# Patient Record
Sex: FEMALE | Employment: UNEMPLOYED | URBAN - METROPOLITAN AREA
[De-identification: names, ages, dates, MRNs, and addresses within clinical notes are randomized per-mention and may not be internally consistent; named-entity substitution may affect disease eponyms.]

---

## 2018-10-31 ENCOUNTER — THERAPY VISIT (OUTPATIENT)
Dept: PHYSICAL THERAPY | Facility: CLINIC | Age: 43
End: 2018-10-31
Payer: COMMERCIAL

## 2018-10-31 DIAGNOSIS — G89.29 CHRONIC PAIN OF BOTH KNEES: Primary | ICD-10-CM

## 2018-10-31 DIAGNOSIS — M25.562 CHRONIC PAIN OF BOTH KNEES: Primary | ICD-10-CM

## 2018-10-31 DIAGNOSIS — M25.561 CHRONIC PAIN OF BOTH KNEES: Primary | ICD-10-CM

## 2018-10-31 PROCEDURE — 97110 THERAPEUTIC EXERCISES: CPT | Mod: GP | Performed by: PHYSICAL THERAPIST

## 2018-10-31 PROCEDURE — 97161 PT EVAL LOW COMPLEX 20 MIN: CPT | Mod: GP | Performed by: PHYSICAL THERAPIST

## 2018-10-31 ASSESSMENT — ACTIVITIES OF DAILY LIVING (ADL)
PAIN: THE SYMPTOM AFFECTS MY ACTIVITY MODERATELY
GO DOWN STAIRS: ACTIVITY IS FAIRLY DIFFICULT
KNEE_ACTIVITY_OF_DAILY_LIVING_SCORE: 30
GO UP STAIRS: ACTIVITY IS FAIRLY DIFFICULT
GIVING WAY, BUCKLING OR SHIFTING OF KNEE: THE SYMPTOM AFFECTS MY ACTIVITY SEVERELY
SQUAT: ACTIVITY IS VERY DIFFICULT
RISE FROM A CHAIR: ACTIVITY IS VERY DIFFICULT
STIFFNESS: THE SYMPTOM AFFECTS MY ACTIVITY MODERATELY
WALK: ACTIVITY IS FAIRLY DIFFICULT
AS_A_RESULT_OF_YOUR_KNEE_INJURY,_HOW_WOULD_YOU_RATE_YOUR_CURRENT_LEVEL_OF_DAILY_ACTIVITY?: NEARLY NORMAL
HOW_WOULD_YOU_RATE_THE_CURRENT_FUNCTION_OF_YOUR_KNEE_DURING_YOUR_USUAL_DAILY_ACTIVITIES_ON_A_SCALE_FROM_0_TO_100_WITH_100_BEING_YOUR_LEVEL_OF_KNEE_FUNCTION_PRIOR_TO_YOUR_INJURY_AND_0_BEING_THE_INABILITY_TO_PERFORM_ANY_OF_YOUR_USUAL_DAILY_ACTIVITIES?: 40
HOW_WOULD_YOU_RATE_THE_OVERALL_FUNCTION_OF_YOUR_KNEE_DURING_YOUR_USUAL_DAILY_ACTIVITIES?: NEARLY NORMAL
WEAKNESS: THE SYMPTOM AFFECTS MY ACTIVITY MODERATELY
KNEE_ACTIVITY_OF_DAILY_LIVING_SUM: 21
RAW_SCORE: 21
KNEEL ON THE FRONT OF YOUR KNEE: I AM UNABLE TO DO THE ACTIVITY
SIT WITH YOUR KNEE BENT: ACTIVITY IS VERY DIFFICULT
STAND: ACTIVITY IS FAIRLY DIFFICULT
SWELLING: THE SYMPTOM AFFECTS MY ACTIVITY SEVERELY
LIMPING: THE SYMPTOM AFFECTS MY ACTIVITY MODERATELY

## 2018-10-31 NOTE — LETTER
[facility]  [address] [address]  [phone]    2018    Re: Marixa Moeller   :   1975  MRN:  [id number]   REFERRING PHYSICIAN:   Pinky Spencer PA-C    [facility]    Date of Initial Evaluation:  10/31/18  Visits:  Rxs Used: 1  Reason for Referral:  Chronic pain of both knees    EVALUATION SUMMARY    [facility] Initial Evaluation    Subjective:  Patient is a 43 year old female presenting with rehab right knee hpi. The history is provided by the patient. No  was used.   Marixa Moeller is a 43 year old female with a bilateral knees (R>L) condition.  Condition occurred with:  Insidious onset.  Condition occurred: for unknown reasons.  This is a new condition  Aug. 2018. Patient started having B knee pain about 2 months ago. Her R knee pain worsened about 3 weeks ago. Her R knee buckled and gave out on her causing her to fall 2 weeks ago. She went to the ER following her fall and was seen for a follow up appointment with orthopedic specialist. Patient does not feel as though her knee will buckle and give out again..    Patient reports pain:  Anterior.    Pain is described as sharp and is constant and reported as 9/10.  Associated symptoms:  Buckling/giving out, edema and loss of motion/stiffness. Pain is worse during the night.  Symptoms are exacerbated by ascending stairs, descending stairs, transfers, bending/squatting, walking and standing and relieved by rest.  Since onset symptoms are unchanged.  Special tests:  X-ray (-).      General health as reported by patient is fair (d/t weight and high blood pressure).  Pertinent medical history includes:  Depression, high blood pressure and overweight.  Medical allergies: no.  Other surgeries include:  None reported.  Current medications:  None as reported by the patient.   Current occupation is student.    Primary job tasks include:  Driving, lifting, prolonged sitting and prolonged standing.  Barriers include:  Stairs.  Red flags:  None as reported by the patient.    Objective:  Gait:    Gait Type:  Antalgic   Assistive Devices:  None  Deviations:  Lumbar:  Trunk lean LKnee:  Knee flexion decr RAnkle:  Push off decr RGeneral Deviations:  Toe out R          Re: Marixa Moeller   :   1975  Knee Evaluation:    ROM:    AROM  Hyperextension:  Left:  WNL -    Right: 1 +  Flexion: Left: WNL    Right: 98 +    PROM  Hyperextension: Left:   Right:  3 +  Flexion: Left:   Right:  102 +    Strength:   Quad Set Left:  WNL    Pain: -   Quad Set Right:  Fair    Pain: +    Ligament Testing:    Varus 0:  Left:  Neg   Right:  Neg  Varus 30:  Left:  Neg  Right:  Neg  Valgus 0:  Left:  Neg  Right:  Pos  Valgus 30:  Left:  Neg    Right:  Pos  Lachman's:  Left:  Neg  Right:  Pos    Special Tests:   Left knee negative for the following special tests:  Meninscal  Right knee positive for the following tests:  Meniscal    Edema:  Edema of the knee: Moderate edema R knee.    Assessment/Plan:    Patient is a 43 year old female with right side knee complaints.    Patient has the following significant findings with corresponding treatment plan.                Diagnosis 1:  Patellofemoral pain syndrome of both knees  Pain -  hot/cold therapy, manual therapy, splint/taping/bracing/orthotics, self management, education and home program  Decreased ROM/flexibility - manual therapy, therapeutic exercise and home program  Decreased strength - therapeutic exercise, therapeutic activities and home program  Decreased proprioception - neuro re-education, gait training, therapeutic activities and home program  Edema - cold therapy and self management/home program  Impaired gait - gait training, assistive devices and home program  Impaired muscle performance - neuro re-education and home program  Decreased function -  therapeutic activities and home program      Therapy Evaluation Codes:   1) History comprised of:   Personal factors that impact the plan of care:      None.    Comorbidity factors that impact the plan of care are:      Depression, High blood pressure and Overweight.     Medications impacting care: None.  Re: Marixa Moeller   :   1975    2) Examination of Body Systems comprised of:   Body structures and functions that impact the plan of care:      Knee.   Activity limitations that impact the plan of care are:      Cooking, Dressing, Squatting/kneeling, Stairs, Standing and Walking.  3) Clinical presentation characteristics are:   Stable/Uncomplicated.  4) Decision-Making    Low complexity using standardized patient assessment instrument and/or   measureable assessment of functional outcome.    Cumulative Therapy Evaluation is: Low complexity.    Previous and current functional limitations:  (See Goal Flow Sheet for this information)    Short term and Long term goals: (See Goal Flow Sheet for this information)     Communication ability:  Patient appears to be able to clearly communicate and understand verbal and written communication and follow directions correctly.  Treatment Explanation - The following has been discussed with the patient:   RX ordered/plan of care  Anticipated outcomes  Possible risks and side effects  This patient would benefit from PT intervention to resume normal activities.   Rehab potential is good.    Frequency:  1 X week, once daily  Duration:  for 6 weeks tapering to 1 X every other week over 4 weeks  Discharge Plan:  Achieve all LTG.  Independent in home treatment program.  Reach maximal therapeutic benefit.    Please refer to the daily flowsheet for treatment today, total treatment time and time spent performing 1:1 timed codes.         Thank you for your referral.    INQUIRIES  Therapist: Faraz Garcia DPT  [facility] PH[facility]ICAL THERAPY  [id number] [facility] [address]  Phone: [phone]  Fax: [phone]

## 2018-10-31 NOTE — PROGRESS NOTES
[facility] Initial Evaluation  Subjective:  Patient is a 43 year old female presenting with rehab right knee hpi. The history is provided by the patient. No  was used.   Marixa Moeller is a 43 year old female with a bilateral knees (R>L) condition.  Condition occurred with:  Insidious onset.  Condition occurred: for unknown reasons.  This is a new condition  Aug. 2018. Patient started having B knee pain about 2 months ago. Her R knee pain worsened about 3 weeks ago. Her R knee buckled and gave out on her causing her to fall 2 weeks ago. She went to the ER following her fall and was seen for a follow up appointment with orthopedic specialist. Patient does not feel as though her knee will buckle and give out again..    Patient reports pain:  Anterior.    Pain is described as sharp and is constant and reported as 9/10.  Associated symptoms:  Buckling/giving out, edema and loss of motion/stiffness. Pain is worse during the night.  Symptoms are exacerbated by ascending stairs, descending stairs, transfers, bending/squatting, walking and standing and relieved by rest.  Since onset symptoms are unchanged.  Special tests:  X-ray (-).      General health as reported by patient is fair (d/t weight and high blood pressure).  Pertinent medical history includes:  Depression, high blood pressure and overweight.  Medical allergies: no.  Other surgeries include:  None reported.  Current medications:  None as reported by the patient.  Current occupation is student.    Primary job tasks include:  Driving, lifting, prolonged sitting and prolonged standing.    Barriers include:  Stairs.    Red flags:  None as reported by the patient.                        Objective:    Gait:    Gait Type:  Antalgic   Assistive Devices:  None  Deviations:  Lumbar:  Trunk lean LKnee:  Knee flexion decr RAnkle:  Push off decr RGeneral Deviations:  Toe out R                                                       Knee Evaluation:  ROM:    AROM    Hyperextension:  Left:  WNL -    Right: 1 +    Flexion: Left: WNL    Right: 98 +  PROM    Hyperextension: Left:   Right:  3 +    Flexion: Left:   Right:  102 +      Strength:         Quad Set Left:  WNL    Pain: -   Quad Set Right:  Fair    Pain: +  Ligament Testing:    Varus 0:  Left:  Neg   Right:  Neg  Varus 30:  Left:  Neg  Right:  Neg  Valgus 0:  Left:  Neg  Right:  Pos  Valgus 30:  Left:  Neg    Right:  Pos      Lachman's:  Left:  Neg  Right:  Pos  Special Tests:     Left knee negative for the following special tests:  Meninscal  Right knee positive for the following tests:  Meniscal    Edema:  Edema of the knee: Moderate edema R knee.            General     ROS    Assessment/Plan:    Patient is a 43 year old female with right side knee complaints.    Patient has the following significant findings with corresponding treatment plan.                Diagnosis 1:  Patellofemoral pain syndrome of both knees  Pain -  hot/cold therapy, manual therapy, splint/taping/bracing/orthotics, self management, education and home program  Decreased ROM/flexibility - manual therapy, therapeutic exercise and home program  Decreased strength - therapeutic exercise, therapeutic activities and home program  Decreased proprioception - neuro re-education, gait training, therapeutic activities and home program  Edema - cold therapy and self management/home program  Impaired gait - gait training, assistive devices and home program  Impaired muscle performance - neuro re-education and home program  Decreased function - therapeutic activities and home program    Therapy Evaluation Codes:   1) History comprised of:   Personal factors that impact the plan of care:      None.    Comorbidity factors that impact the plan of care are:      Depression, High blood pressure and Overweight.     Medications impacting care: None.  2) Examination of Body Systems comprised of:   Body structures and functions that  impact the plan of care:      Knee.   Activity limitations that impact the plan of care are:      Cooking, Dressing, Squatting/kneeling, Stairs, Standing and Walking.  3) Clinical presentation characteristics are:   Stable/Uncomplicated.  4) Decision-Making    Low complexity using standardized patient assessment instrument and/or measureable assessment of functional outcome.  Cumulative Therapy Evaluation is: Low complexity.    Previous and current functional limitations:  (See Goal Flow Sheet for this information)    Short term and Long term goals: (See Goal Flow Sheet for this information)     Communication ability:  Patient appears to be able to clearly communicate and understand verbal and written communication and follow directions correctly.  Treatment Explanation - The following has been discussed with the patient:   RX ordered/plan of care  Anticipated outcomes  Possible risks and side effects  This patient would benefit from PT intervention to resume normal activities.   Rehab potential is good.    Frequency:  1 X week, once daily  Duration:  for 6 weeks tapering to 1 X every other week over 4 weeks  Discharge Plan:  Achieve all LTG.  Independent in home treatment program.  Reach maximal therapeutic benefit.    Please refer to the daily flowsheet for treatment today, total treatment time and time spent performing 1:1 timed codes.

## 2018-10-31 NOTE — MR AVS SNAPSHOT
After Visit Summary   10/31/2018    Marixa Moeller    MRN: [id number]           Patient Information     Date Of Birth          1975        Visit Information        Provider Department      10/31/2018 10:10 AM Faraz Garcia, PT [facility] Physical Therapy        Today's Diagnoses     Chronic pain of both knees    -  1       Follow-ups after your visit        Your next 10 appointments already scheduled     Nov 05, 2018 10:30 AM CST   OBDULIA Extremity with Adelia Lopez ATC   [facility]    [address] [place] [address]   [phone]            Nov 07, 2018 11:30 AM CST   OBDULIA Extremity with Faraz Garcia PT   [facility] Physical Therapy ([facility]  )    [address] [place] [address]   [phone]            Nov 12, 2018  9:10 AM CST   OBDULIA Extremity with Adelia Lopez ATC   [facility]    [address] [place] [address]   [phone]            Nov 14, 2018 12:00 PM CST   OBDULIA Extremity with Adelia Lopez ATC   [facility]    [address] [place] [address]   [phone]              Who to contact     If you have questions or need follow up information about today's clinic visit or your schedule please contact [facility] PHYSICAL THERAPY directly at [phone].  Normal or non-critical lab and imaging results will be communicated to you by MyChart, letter or phone within 4 business days after the clinic has received the results. If you do not hear from us within 7 days, please contact the clinic through MyChart or phone. If you have a critical or abnormal lab result, we will notify you by phone as soon as  "possible.  Submit refill requests through [employer] or call your pharmacy and they will forward the refill request to us. Please allow 3 business days for your refill to be completed.          Additional Information About Your Visit        [employer] Information     [employer] lets you send messages to your doctor, view your test results, renew your prescriptions, schedule appointments and more. To sign up, go to [email or web address] . Click on \"Log in\" on the left side of the screen, which will take you to the Welcome page. Then click on \"Sign up Now\" on the right side of the page.     You will be asked to enter the access code listed below, as well as some personal information. Please follow the directions to create your username and password.     Your access code is: [email or web address]  Expires: 2019  1:08 PM     Your access code will  in 90 days. If you need help or a new code, please call your [place] clinic or [phone].        Care EveryWhere ID     This is your Care EveryWhere ID. This could be used by other organizations to access your [place] medical records  [id number]         Blood Pressure from Last 3 Encounters:   No data found for BP    Weight from Last 3 Encounters:   No data found for Wt              We Performed the Following     OBDULIA Inital Eval Report     PT Eval, Low Complexity (23236)     Therapeutic Exercises        Primary Care Provider Fax #    Physician No Ref-Primary [phone]       No address on file        Equal Access to Services     AFIA DIAL : Hadii abdirashid bustilloso Sothomasali, waaxda luqadaha, qaybta kaalmada adeegyada, tani decker . So [place] -[phone].    ATENCIÓN: Si habla español, tiene a pompa disposición servicios gratuitos de asistencia lingüística. Llame al [phone].    We comply with applicable federal civil rights laws and [place] laws. We do not discriminate on the basis of race, color, national origin, age, disability, sex, sexual " orientation, or gender identity.            Thank you!     Thank you for choosing [facility]  for your care. Our goal is always to provide you with excellent care. Hearing back from our patients is one way we can continue to improve our services. Please take a few minutes to complete the written survey that you may receive in the mail after your visit with us. Thank you!             Your Updated Medication List - Protect others around you: Learn how to safely use, store and throw away your medicines at www.disposemymeds.org.      Notice  As of 10/31/2018 11:59 PM    You have not been prescribed any medications.

## 2018-11-02 PROBLEM — M25.562 CHRONIC PAIN OF BOTH KNEES: Status: ACTIVE | Noted: 2018-11-02

## 2018-11-02 PROBLEM — G89.29 CHRONIC PAIN OF BOTH KNEES: Status: ACTIVE | Noted: 2018-11-02

## 2018-11-02 PROBLEM — M25.561 CHRONIC PAIN OF BOTH KNEES: Status: ACTIVE | Noted: 2018-11-02

## 2019-08-22 PROBLEM — M25.561 CHRONIC PAIN OF BOTH KNEES: Status: RESOLVED | Noted: 2018-11-02 | Resolved: 2019-08-22

## 2019-08-22 PROBLEM — G89.29 CHRONIC PAIN OF BOTH KNEES: Status: RESOLVED | Noted: 2018-11-02 | Resolved: 2019-08-22

## 2019-08-22 PROBLEM — M25.562 CHRONIC PAIN OF BOTH KNEES: Status: RESOLVED | Noted: 2018-11-02 | Resolved: 2019-08-22
